# Patient Record
Sex: FEMALE
[De-identification: names, ages, dates, MRNs, and addresses within clinical notes are randomized per-mention and may not be internally consistent; named-entity substitution may affect disease eponyms.]

---

## 2019-02-02 ENCOUNTER — HOSPITAL ENCOUNTER (EMERGENCY)
Dept: HOSPITAL 42 - ED | Age: 49
Discharge: HOME | End: 2019-02-02
Payer: COMMERCIAL

## 2019-02-02 VITALS — BODY MASS INDEX: 30.2 KG/M2

## 2019-02-02 VITALS
DIASTOLIC BLOOD PRESSURE: 65 MMHG | TEMPERATURE: 98.7 F | HEART RATE: 85 BPM | SYSTOLIC BLOOD PRESSURE: 112 MMHG | OXYGEN SATURATION: 100 %

## 2019-02-02 VITALS — RESPIRATION RATE: 18 BRPM

## 2019-02-02 DIAGNOSIS — Z87.891: ICD-10-CM

## 2019-02-02 DIAGNOSIS — M54.2: Primary | ICD-10-CM

## 2019-02-02 DIAGNOSIS — G62.9: ICD-10-CM

## 2019-02-02 DIAGNOSIS — I10: ICD-10-CM
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
## 2019-02-03 NOTE — CT
Date of service: 



02/02/2019



PROCEDURE:  CT HEAD WITHOUT CONTRAST.



HISTORY:

headache



COMPARISON:

Comparison made with CT scan brain 10/25/2013



TECHNIQUE:

Axial computed tomography images were obtained through the head/brain 

without intravenous contrast.  



Radiation dose:



Total exam DLP = 808.41 mGy-cm.



This CT exam was performed using one or more of the following dose 

reduction techniques: Automated exposure control, adjustment of the 

mA and/or kV according to patient size, and/or use of iterative 

reconstruction technique.



FINDINGS:



HEMORRHAGE:

No parenchymal, subarachnoid nor extra-axial hemorrhage. 



BRAIN:

No mass effect or edema.  No atrophy or chronic microvascular 

ischemic changes.



VENTRICLES:

There is no evidence of obstructive hydrocephalus however the 

ventricles are mildly dilated right larger than left. 



CALVARIUM:

No acute calvarial fractures.



PARANASAL SINUSES:

Frontal sinuses are slightly underpneumatized/hypoplastic.  Remaining 

visualized paranasal sinuses are well-developed and currently 

well-aerated. 



MASTOID AIR CELLS:

Unremarkable as visualized. No inflammatory changes.



OTHER FINDINGS:

None.



IMPRESSION:

No acute intracranial hemorrhage.  No hydrocephalus however the 

ventricles are mildly prominent right side slightly larger than left.

## 2019-02-03 NOTE — CT
Date of service: 



02/02/2019



PROCEDURE:  CT Cervical Spine without contrast



HISTORY:

neck pain



COMPARISON:

None available.



TECHNIQUE:

Axial computed tomography images were obtained of the cervical spine 

without the use of intravenous contrast. Coronal and sagittal 

reformatted images were created and reviewed.



Radiation dose:



Total exam DLP = 490.44 mGy-cm.



This CT exam was performed using one or more of the following dose 

reduction techniques: Automated exposure control, adjustment of the 

mA and/or kV according to patient size, and/or use of iterative 

reconstruction technique.



FINDINGS:



VERTEBRAE:

No acute compression fractures no retropulsed fragments..  There is 

slight reversal of the normal cervical lordosis which is likely due 

to patient positioning in the gantry however underlying element of 

muscle spasm may contribute.  Vertebral bodies and facets otherwise 

normally aligned. 



Mild 



DISCS/SPINAL CANAL/NEURAL FORAMINA:

Mild multilevel degenerative spondylosis. 



At the C6-C7 level, there is mild disc space narrowing more so along 

the anterior disc margin with small slightly irregular disc ridge 

complex focally larger on left than right which indents the ventral 

surface of the thecal sac and appears to minimally indent the ventral 

surface of the cord.  The overall central canal at this level is 

marginal to adequate.  Exit foramina adequate. 



At the C5-C6 level, there is also disc space narrowing.  Minimal 

broad-based disc is present with slightly overgrown uncovertebral 

joints.  Facets also prominent.  Central canal and exit foramina 

adequate. 



Similar changes seen at the C4-C5 level. 



PARASPINAL SOFT TISSUES:

Unremarkable. 



OTHER FINDINGS:

None.



IMPRESSION:

No acute fractures.  Minor multilevel degenerative spondylosis most 

notably affecting the C6-C7 level.



Mild reversal of the normal cervical lordosis which may in part be 

due to patient positioning in the gantry however underlying element 

of muscle spasm may contribute.